# Patient Record
Sex: FEMALE | Race: WHITE | HISPANIC OR LATINO | URBAN - METROPOLITAN AREA
[De-identification: names, ages, dates, MRNs, and addresses within clinical notes are randomized per-mention and may not be internally consistent; named-entity substitution may affect disease eponyms.]

---

## 2023-10-16 NOTE — PROGRESS NOTES
"Franci Cooper is a 23 y.o. female  presents for complaint of painful cycles since she was 15 yo.     Menstrual cycle is monthly (q30-40 days, not the same each month), duration= 4-6 days, not heavy. She has severe cramping and nausea every cycle that is not relieved by ibuprofen or heat pads. Contraception: condoms only.     Denies excessive facial or chest hair. Denies pain with intercourse, discharge, itching, or odor.    Denies constipation, diarrhea. Denies dysuria, hematuria.     History reviewed. No pertinent past medical history.    History reviewed. No pertinent surgical history.    OB History    Para Term  AB Living   0 0 0 0 0 0   SAB IAB Ectopic Multiple Live Births   0 0 0 0 0       Family History   Problem Relation Age of Onset    Ovarian cancer Maternal Grandmother     Clotting disorder Mother         blood clots       Social History     Tobacco Use    Smoking status: Every Day     Types: Vaping with nicotine    Smokeless tobacco: Never   Substance Use Topics    Alcohol use: Yes     Comment: occassionally    Drug use: Never       Ht 5' 3" (1.6 m)   Wt 83.8 kg (184 lb 11.9 oz)   LMP 10/14/2023   BMI 32.73 kg/m²     ROS:  GENERAL: Denies weight gain or weight loss. Feeling well overall.   SKIN: Denies rash or lesions.   HEAD: Denies head injury or headache.   ABDOMEN: No abdominal pain, constipation, diarrhea, nausea, vomiting or rectal bleeding.   URINARY: No frequency, dysuria, hematuria, or burning on urination.  REPRODUCTIVE: See HPI.   HEMATOLOGIC: No easy bruisability or excessive bleeding.    Physical Exam:    APPEARANCE: Well nourished, well developed, in no acute distress.  AFFECT: WNL, alert and oriented x 3  SKIN: No acne or hirsutism  CHEST: Good respiratory effect  ABDOMEN: Soft.  No tenderness or masses. No hernias.  PELVIC: Deferred  EXTREMITIES: No edema.    ASSESSMENT AND PLAN  1. Painful menstruation  US Transvaginal Non OB        Pelvic ultrasound ordered to r/o " structural causes of painful menses. Advised to use 800mg ibuprofen q8hrs prn for pain. Discussed birth control options, risk of blood clotting with estrogen and increased risk with current vape use. She is willing to quit vaping but wants to wait for ultrasound results before initiating.     Follow up pending results.    Patient confirms understanding of encounter and all medical questions answered.

## 2023-10-19 ENCOUNTER — OFFICE VISIT (OUTPATIENT)
Dept: OBSTETRICS AND GYNECOLOGY | Facility: CLINIC | Age: 23
End: 2023-10-19
Payer: MEDICAID

## 2023-10-19 VITALS — HEIGHT: 63 IN | BODY MASS INDEX: 32.73 KG/M2 | WEIGHT: 184.75 LBS

## 2023-10-19 DIAGNOSIS — N94.6 PAINFUL MENSTRUATION: Primary | ICD-10-CM

## 2023-10-19 PROCEDURE — 99203 OFFICE O/P NEW LOW 30 MIN: CPT | Mod: PBBFAC,PO

## 2023-10-19 PROCEDURE — 1159F PR MEDICATION LIST DOCUMENTED IN MEDICAL RECORD: ICD-10-PCS | Mod: CPTII,,,

## 2023-10-19 PROCEDURE — 1160F PR REVIEW ALL MEDS BY PRESCRIBER/CLIN PHARMACIST DOCUMENTED: ICD-10-PCS | Mod: CPTII,,,

## 2023-10-19 PROCEDURE — 1159F MED LIST DOCD IN RCRD: CPT | Mod: CPTII,,,

## 2023-10-19 PROCEDURE — 3008F BODY MASS INDEX DOCD: CPT | Mod: CPTII,,,

## 2023-10-19 PROCEDURE — 1160F RVW MEDS BY RX/DR IN RCRD: CPT | Mod: CPTII,,,

## 2023-10-19 PROCEDURE — 3008F PR BODY MASS INDEX (BMI) DOCUMENTED: ICD-10-PCS | Mod: CPTII,,,

## 2023-10-19 PROCEDURE — 99999 PR PBB SHADOW E&M-NEW PATIENT-LVL III: ICD-10-PCS | Mod: PBBFAC,,,

## 2023-10-19 PROCEDURE — 99999 PR PBB SHADOW E&M-NEW PATIENT-LVL III: CPT | Mod: PBBFAC,,,

## 2023-10-19 PROCEDURE — 99203 PR OFFICE/OUTPT VISIT, NEW, LEVL III, 30-44 MIN: ICD-10-PCS | Mod: S$PBB,,,

## 2023-10-19 PROCEDURE — 99203 OFFICE O/P NEW LOW 30 MIN: CPT | Mod: S$PBB,,,

## 2023-10-19 RX ORDER — DOXYCYCLINE 100 MG/1
100 CAPSULE ORAL 2 TIMES DAILY
COMMUNITY
Start: 2023-07-26

## 2023-10-20 ENCOUNTER — HOSPITAL ENCOUNTER (OUTPATIENT)
Dept: RADIOLOGY | Facility: HOSPITAL | Age: 23
Discharge: HOME | End: 2023-10-20
Payer: MEDICAID

## 2023-10-20 DIAGNOSIS — N94.6 PAINFUL MENSTRUATION: ICD-10-CM

## 2023-10-20 PROCEDURE — 76830 TRANSVAGINAL US NON-OB: CPT | Mod: 26,,, | Performed by: RADIOLOGY

## 2023-10-20 PROCEDURE — 76830 US PELVIS COMP WITH TRANSVAG NON-OB (XPD): ICD-10-PCS | Mod: 26,,, | Performed by: RADIOLOGY

## 2023-10-20 PROCEDURE — 76856 US EXAM PELVIC COMPLETE: CPT | Mod: 26,,, | Performed by: RADIOLOGY

## 2023-10-20 PROCEDURE — 76830 TRANSVAGINAL US NON-OB: CPT | Mod: TC

## 2023-10-20 PROCEDURE — 76856 US PELVIS COMP WITH TRANSVAG NON-OB (XPD): ICD-10-PCS | Mod: 26,,, | Performed by: RADIOLOGY

## 2024-01-22 ENCOUNTER — PATIENT MESSAGE (OUTPATIENT)
Dept: OBSTETRICS AND GYNECOLOGY | Facility: CLINIC | Age: 24
End: 2024-01-22
Payer: MEDICAID

## 2024-04-11 ENCOUNTER — PATIENT MESSAGE (OUTPATIENT)
Dept: OBSTETRICS AND GYNECOLOGY | Facility: CLINIC | Age: 24
End: 2024-04-11
Payer: MEDICAID

## 2025-01-10 ENCOUNTER — TELEPHONE (OUTPATIENT)
Dept: ORTHOPEDICS | Facility: CLINIC | Age: 25
End: 2025-01-10
Payer: MEDICAID

## 2025-01-10 ENCOUNTER — PATIENT MESSAGE (OUTPATIENT)
Dept: ORTHOPEDICS | Facility: CLINIC | Age: 25
End: 2025-01-10
Payer: MEDICAID

## 2025-02-10 ENCOUNTER — OFFICE VISIT (OUTPATIENT)
Dept: PODIATRY | Facility: CLINIC | Age: 25
End: 2025-02-10
Payer: MEDICAID

## 2025-02-10 VITALS — BODY MASS INDEX: 33.02 KG/M2 | WEIGHT: 186.38 LBS

## 2025-02-10 DIAGNOSIS — M79.671 BILATERAL FOOT PAIN: Primary | ICD-10-CM

## 2025-02-10 DIAGNOSIS — S92.354S CLOSED NONDISPLACED FRACTURE OF FIFTH METATARSAL BONE OF RIGHT FOOT, SEQUELA: ICD-10-CM

## 2025-02-10 DIAGNOSIS — M76.71 PERONEAL TENDINITIS OF RIGHT LOWER EXTREMITY: Primary | ICD-10-CM

## 2025-02-10 DIAGNOSIS — M79.672 BILATERAL FOOT PAIN: Primary | ICD-10-CM

## 2025-02-10 PROCEDURE — 99213 OFFICE O/P EST LOW 20 MIN: CPT | Mod: PBBFAC,PN | Performed by: PODIATRIST

## 2025-02-10 PROCEDURE — 99999 PR PBB SHADOW E&M-EST. PATIENT-LVL III: CPT | Mod: PBBFAC,,, | Performed by: PODIATRIST

## 2025-02-10 RX ORDER — METHYLPREDNISOLONE 4 MG/1
TABLET ORAL
Qty: 21 EACH | Refills: 0 | Status: SHIPPED | OUTPATIENT
Start: 2025-02-10

## 2025-02-10 NOTE — PROGRESS NOTES
[facility] - PODIATRY  [address]  [address]  [place] [address]  Dept: [phone]  Dept Fax: [phone]    Joseph Henry Jr., DPM     Assessment:   MDM    Coding  1. Peroneal tendinitis of right lower extremity  methylPREDNISolone (MEDROL DOSEPACK) 4 mg tablet      2. Closed nondisplaced fracture of fifth metatarsal bone of right foot, sequela            Plan:     Procedures  1. Peroneal tendinitis of right lower extremity  -     methylPREDNISolone (MEDROL DOSEPACK) 4 mg tablet; use as directed  Dispense: 21 each; Refill: 0    2. Closed nondisplaced fracture of fifth metatarsal bone of right foot, sequela      Franci was seen today for foot injury.    Diagnoses and all orders for this visit:    Peroneal tendinitis of right lower extremity  -     methylPREDNISolone (MEDROL DOSEPACK) 4 mg tablet; use as directed    Closed nondisplaced fracture of fifth metatarsal bone of right foot, sequela        -pt seen, evaluated, and managed  -dx discussed in detail. All questions/concerns addressed  -all tx options discussed. All alternatives, risks, benefits of all txs discussed  -We discussed conservative care options possible including but not limited to shoe wear and/or padding, bracing/strapping, at home ROM, formal PT, medical therapy, injection therapy  - The utilization of NSAIDs can be considered but their benefit has to be tempered against the risk of GI/ concerns  - A steroid injection can be undertaken.  We did discuss the potential mechanism of action of this shot.  Understanding that multiple injections at the same anatomic site do have deleterious effects on the soft tissue.  Generic risks include: steroid flare (advised to ice if necessary), skin hypo-pgimentation (which can be permanent and unsightly), elevation of blood sugar, subcutaneous atrophy (can be permanent) and infection.   -XR/imaging reviewed by me: avulsion fx 5th MT base - mostly healed  -labs  reviewed by me: ok for medrol  -implemented icing/stretching regimen  - CAM boot  dispensed  -pwbat on heel in CAM when not working  -ok to return to work full duty, no restrictions    -rxs dispensed: medrol  -referrals: none  -WB: as above      Follow up in about 6 weeks (around 3/24/2025).    Subjective:      Patient ID: Franci Cooper is a 25 y.o. female.    Chief Complaint:   Chief Complaint   Patient presents with    Foot Injury     Right        CC - foot pain: patient presents to the podiatry clinic  with complaint of  right foot pain. Onset of the symptoms was several months ago. Precipitating event: broke foot in sept 2024. Never had f/u or tx other than urgent care.  Current symptoms include: ability to bear weight, but with some pain, stiffness, swelling and worsening symptoms after a period of activity. Aggravating factors: walking and certain shoegear. Symptoms have gradually worsened. Patient has had no prior foot problems. Evaluation to date: none. Treatment to date: avoidance of offending activity. Patients rates pain 7/10 on pain scale.        HPI    Last Podiatry Enc: Visit date not found  Last Enc w/ Me: Visit date not found    Outside reports reviewed: historical medical records.  Family hx: as below  No past medical history on file.  No past surgical history on file.  Family History   Problem Relation Name Age of Onset    Ovarian cancer Maternal Grandmother      Clotting disorder Mother          blood clots     Current Outpatient Medications   Medication Sig Dispense Refill    doxycycline (VIBRAMYCIN) 100 MG Cap Take 100 mg by mouth 2 (two) times daily.      methylPREDNISolone (MEDROL DOSEPACK) 4 mg tablet use as directed 21 each 0     No current facility-administered medications for this visit.     Review of patient's allergies indicates:  No Known Allergies  Social History     Socioeconomic History    Marital status: Single   Tobacco Use    Smoking status: Every Day     Types: Vaping with nicotine     Smokeless tobacco: Never   Substance and Sexual Activity    Alcohol use: Yes     Comment: occassionally    Drug use: Never    Sexual activity: Yes     Partners: Male     Birth control/protection: Condom       ROS    REVIEW OF SYSTEMS: Negative as documented below as well as positive findings in bold.       Constitutional  Respiratory  Gastrointestinal  Skin   - Fever - Cough - Heartburn - Rash   - Chills - Spit blood - Nausea - Itching   - Weight Loss - Shortness of breath - Vomiting - Nail pain   - Malaise/Fatigue - Wheezing - Abdominal Pain  Wound/Ulcer   - Weight Gain   - Blood in Stool  Poor wound healing       - Diarrhea          Cardiovascular  Genitourinary  Neurological  HEENT   - Chest Pain - Dysuria - Burning Sensation of feet - Headache   - Palpitations - Hematuria - Tingling / Paresthesia - Congestion   - Pain at night in legs - Flank Pain - Dizziness - Sore Throat   - Cramping   - Tremor - Blurred Vision   - Leg Swelling   - Sensory Change - Double Vision   - Dizzy when standing   - Speech Change - Eye Redness       - Focal Weakness - Dry Eyes       - Loss of Consciousness          Endocrine  Musculoskeletal  Psychiatric   - Cold intolerance - Muscle Pain - Depression   - Heat intolerance - Neck Pain - Insomnia   - Anemia - Joint Pain - Memory Loss   -  Easy bruising, bleeding - Heel pain - Anxiety      Toe Pain        Leg/Ankle/Foot Pain         Objective:     Wt 84.5 kg (186 lb 6.4 oz)   BMI 33.02 kg/m²   Vitals:    02/10/25 0904   Weight: 84.5 kg (186 lb 6.4 oz)   PainSc:   2       Physical Exam    General Appearance:   Patient appears well developed, well nourished  Patient appears stated age    Psychiatric:   Patient is oriented to time, place, and person.  Patient has appropriate mood and affect    Neck:  Trachea Midline  No visible masses    Respiratory/Ears:  No distress or labored breathing.  Able to differentiate between normal talking voice and whisper.  Able to follow  commands    Eyes:  Visual Acuity intact  Lids and conjunctivae normal. No discoloration noted.    Foot Exam  Physical Exam  Ortho Exam  Ortho/SPM Exam  Physical Exam  Foot/Ankle Musculoskeletal Exam    R LE exam con't:  V:  DP 2/4, PT 2/4   CRT< 3s to all digits tested   Tibial and popliteal lymph nodes are w/o abnormality   Edema: absent, varicosities: absent    N:  Patient displays normal ankle reflexes   SILT in SP/DP/T/Tod/Saph distributions    Ortho: +Motor EHL/FHL/TA/GA   Observed enlarged bony prominence at the 5th MT base  There is moderate pain with palpation of peroneal insertion and 5th MT base  Compartments soft/compressible. No pain on passive stretch of big toe. No calf  Pain.    Derm:  skin intact, skin warm and dry, skin without ulcers or lesions, skin without induration, nails normal, no erythema and no ecchymosis    Imaging / Labs:      X-Ray Foot Complete 3 view Right    Result Date: 2/10/2025  EXAMINATION: XR FOOT COMPLETE 3 VIEW RIGHT CLINICAL HISTORY: . Pain in right foot TECHNIQUE: AP, lateral, and oblique views of the right foot were performed. COMPARISON: None FINDINGS: No evidence of acute fracture or dislocation.  Prominent osteophyte at the level of the base of the 5th metatarsal which may represent remote injury.  Joint spaces are maintained.  No osseous destructive process.  No radiopaque foreign body.     No evidence of acute fracture. Electronically signed by: Tanner Reyna MD Date:    02/10/2025 Time:    08:51        Note: This was dictated using a computer transcription program. Although proofread, it may contain computer transcription errors and phonetic errors. Other human proofreading errors may also exist. Corrections may be performed at a later time. Please contact us for any clarification if needed.    Joseph Henry DPM  [facility]

## 2025-02-10 NOTE — PATIENT INSTRUCTIONS
Peroneal Tendon Injuries  What Are the Peroneal Tendons?         A tendon is a band of tissue that connects a muscle to a bone. The two peroneal tendons in the foot run side by side behind the outer ankle bone. One peroneal tendon attaches to the outer part of the midfoot, while the other tendon runs under the foot and attaches near the inside of the arch. The main function of the peroneal tendons is to stabilize the foot and ankle and protect them from sprains.    Causes & Symptoms of Peroneal Tendon Injuries  Peroneal tendon injuries may be acute (occurring suddenly) or chronic (developing over a period of time). They most commonly occur in individuals who participate in sports that involve repetitive ankle motion. In addition, people with higher arches are at risk for developing peroneal tendon injuries. Basic types of peroneal tendon injuries are tendonitis, tears and subluxation.    Tendonitis is an inflammation of one or both tendons. The inflammation is caused by activities involving repetitive use of the tendon, overuse of the tendon or trauma (such as an ankle sprain). Symptoms of tendonitis include:    Pain  Swelling  Warm to the touch     Acute tears are caused by repetitive activity or trauma. Immediate symptoms of acute tears include:    Pain  Swelling  Weakness or instability of the foot and ankle     As time goes on, these tears may lead to a change in the shape of the foot in which the arch may become higher.    Degenerative tears (tendonosis) are usually due to overuse and occur over long periods of time, often years. In degenerative tears, the tendon is like taffy that has been overstretched until it becomes thin and eventually frays. Having high arches also puts you at risk for developing a degenerative tear. The symptoms of degenerative tears may include:    Sporadic pain (occurring from time to time) on the outside of the ankle  Weakness or instability in the ankle  An increase in the height of  the arch     Subluxation means one or both tendons have slipped out of their normal position. In some cases, subluxation is due to a condition in which a person is born with a variation in the shape of the bone or muscle. In other cases, subluxation occurs following trauma, such as an ankle sprain. Damage or injury to the tissues that stabilize the tendons (retinaculum) can lead to chronic tendon subluxation. The symptoms of subluxation may include:    A snapping feeling of the tendon around the ankle bone  Sporadic pain behind the outside ankle bone  Ankle instability or weakness     Early treatment of a subluxation is critical since a tendon that continues to sublux (move out of position) is more likely to tear or rupture. Therefore, if you feel the characteristic snapping, see a foot and ankle surgeon immediately.    Diagnosis  Because peroneal tendon injuries are sometimes misdiagnosed and may worsen without proper treatment, prompt evaluation by a foot and ankle surgeon is advised. To diagnose a peroneal tendon injury, the surgeon will examine the foot and look for pain, instability, swelling, warmth and weakness on the outer side of the ankle. In addition, an x-ray or other advanced imaging studies may be needed to fully evaluate the injury. The foot and ankle surgeon will also look for signs of an ankle sprain and other related injuries that sometimes accompany a peroneal tendon injury. Proper diagnosis is important because prolonged discomfort after a simple sprain may be a sign of additional problems.    Nonsurgical Treatment  Treatment depends on the type of peroneal tendon injury. Options include:    Immobilization. A cast or splint may be used to keep the foot and ankle from moving and allow the injury to heal.  Medications. Oral or injected anti-inflammatory drugs may help relieve pain and inflammation.  Physical therapy. Ice, heat or ultrasound therapy may be used to reduce swelling and pain. As  symptoms improve, exercises can be added to strengthen the muscles and improve range of motion and balance.  Bracing. The surgeon may provide a brace to use for a short while or during activities requiring repetitive ankle motion. Bracing may also be an option when a patient is not a candidate for surgery.     When Is Surgery Needed?  In some cases, surgery may be needed to repair the tendon or tendons and perhaps the supporting structures of the foot. The foot and ankle surgeon will determine the most appropriate procedure for the patients condition and lifestyle. After surgery, physical therapy is an important part of rehabilitation.      What is Tendonitis of the Foot?  When you use a set of muscles too much, youre likely to strain the tendons (soft tissues) that connect those muscles to your bones. At first, pain or swelling may come and go quickly. But if you do too much too soon, your muscles may overtire again. The strain may cause a tendons outer covering to swell or small fibers in a tendon to pull apart. If you keep pushing your muscles, damage to the tendons adds up, and tendonitis develops. Over time, pain and swelling may limit your activities. But with your doctors help, tendonitis can be controlled. Both your symptoms and your risk of future problems including tendon rupture can be reduced.       The back of your foot  The Achilles tendon connects the calf muscle to the heel bone. If tendonitis occurs here, you may feel pain when your foot touches down or when your heel lifts off the ground.   The front of your foot  The anterior tibial tendon helps control the front of your foot when it meets the ground. If this tendon is strained, you may feel pain when you go down stairs or walk or run on hills.     The inside of your foot  The posterior tibial tendon runs along the inside of the ankle and foot. If this tendon is strained, your foot may hurt when it moves forward to push off the ground. Or you  may feel pain when your heel shifts from side to side.   The outside of your foot  The peroneal tendon wraps across the bottom of your foot, from the outside to the inside. Tendonitis here may cause pain when you stand or push off the ground and when walking on uneven surfaces.   Date Last Reviewed: 9/21/2015  © 9465-0320 [employer]. [address] [place] [address]. All rights reserved. This information is not intended as a substitute for professional medical care. Always follow your healthcare professional's instructions.        Treating Tendonitis of the Foot  Your healthcare provider's first concern is to reduce your symptoms. Using ice and heat, taking medicines, and limiting activity help control pain and swelling. Follow all of your healthcare provider's instructions. Returning to activity too soon may cause your symptoms to come back.    Ice and heat  Ice helps prevent swelling and reduce pain. Place ice on the painful area for 10 to 15 minutes. Repeat the icing several times a day. If ?you have had the problem for a while, using heat may help. Apply a heating pad or hot towels to the tendon for 20 to 30 minutes 2 or 3 times a day.  Medicines  Your healthcare provider may tell you to take ibuprofen or other anti-inflammatory medicines. These reduce pain and swelling. Take them as directed. Dont wait until you feel pain. In more severe cases, cortisone may be injected to relieve pain.  Limiting activities  Rest allows the tissues in your foot to heal. Stay off your feet for a few days, then slowly work back into activity. If you do high-impact activities, such as running or aerobics, try other activities that place less strain on your foot. Cycling and swimming are good choices.  Date Last Reviewed: 9/21/2015  © 7826-4629 [employer]. [address] [place] [address]. All rights reserved. This information is not intended as a substitute for professional medical  care. Always follow your healthcare professional's instructions.

## 2025-03-19 ENCOUNTER — PATIENT MESSAGE (OUTPATIENT)
Dept: PODIATRY | Facility: CLINIC | Age: 25
End: 2025-03-19
Payer: MEDICAID

## 2025-03-19 ENCOUNTER — TELEPHONE (OUTPATIENT)
Dept: PODIATRY | Facility: CLINIC | Age: 25
End: 2025-03-19
Payer: MEDICAID

## 2025-03-19 NOTE — TELEPHONE ENCOUNTER
----- Message from Meliza sent at 3/19/2025  1:50 PM CDT -----  Type:  Needs Medical AdviceWho Called: PatientBest Call Back Number: [facility]Additional Information: Patient is requesting a message to be sent to her about her upcoming appointment, she is having difficulties with taking off work.

## 2025-05-30 ENCOUNTER — OFFICE VISIT (OUTPATIENT)
Facility: CLINIC | Age: 25
End: 2025-05-30
Payer: MEDICAID

## 2025-05-30 ENCOUNTER — PATIENT MESSAGE (OUTPATIENT)
Facility: CLINIC | Age: 25
End: 2025-05-30

## 2025-05-30 VITALS
HEIGHT: 63 IN | SYSTOLIC BLOOD PRESSURE: 120 MMHG | WEIGHT: 184.31 LBS | BODY MASS INDEX: 32.66 KG/M2 | DIASTOLIC BLOOD PRESSURE: 80 MMHG

## 2025-05-30 DIAGNOSIS — Z01.419 WELL WOMAN EXAM WITH ROUTINE GYNECOLOGICAL EXAM: Primary | ICD-10-CM

## 2025-05-30 DIAGNOSIS — N94.6 DYSMENORRHEA: ICD-10-CM

## 2025-05-30 PROCEDURE — 99999 PR PBB SHADOW E&M-EST. PATIENT-LVL II: CPT | Mod: PBBFAC,,, | Performed by: STUDENT IN AN ORGANIZED HEALTH CARE EDUCATION/TRAINING PROGRAM

## 2025-05-30 PROCEDURE — 99212 OFFICE O/P EST SF 10 MIN: CPT | Mod: PBBFAC,PN | Performed by: STUDENT IN AN ORGANIZED HEALTH CARE EDUCATION/TRAINING PROGRAM

## 2025-05-30 RX ORDER — KETOROLAC TROMETHAMINE 10 MG/1
10 TABLET, FILM COATED ORAL EVERY 6 HOURS
Qty: 20 TABLET | Refills: 0 | Status: SHIPPED | OUTPATIENT
Start: 2025-05-30 | End: 2025-06-04

## 2025-05-30 NOTE — PROGRESS NOTES
CC: Well woman exam    HPI:  Franci Cooper is a 25 y.o. female  presents for a well woman exam.  She reports severe pain with periods, getting worse with time. Last few months has had trouble at work due to pain, feels like it is preventing some of her normal day to day activities.       Patient history:   History reviewed. No pertinent past medical history.  History reviewed. No pertinent surgical history.  OB History    Para Term  AB Living   0 0 0 0 0 0   SAB IAB Ectopic Multiple Live Births   0 0 0 0 0       GYN  Menopausal: No  History of abnormal paps: N/A  Abnormal or postmenopausal bleeding: DENIES  History of abnormal mammograms:N/A   Family history of breast or ovarian cancer: ovarian   Any breast masses, pain, skin changes, or nipple discharge: DENIES  Possible recent STD exposure: denies  Contraception: Condoms    Pap: Done today  Mammogram: N/A      Family History   Problem Relation Name Age of Onset    Ovarian cancer Maternal Grandmother      Clotting disorder Mother          blood clots     Social History[1]  Allergies: Patient has no known allergies.  Current Medications[2]       ROS:  GENERAL: Denies weight gain or weight loss. Feeling well overall.   SKIN: Denies rash or lesions.   HEAD: Denies head injury or headache.   NODES: Denies enlarged lymph nodes.   CHEST: Denies chest pain or shortness of breath.   CARDIOVASCULAR: Denies palpitations or left sided chest pain.   ABDOMEN: No abdominal pain, constipation, diarrhea, nausea, vomiting or rectal bleeding.   URINARY: No frequency, dysuria, hematuria, or burning on urination.  REPRODUCTIVE: See HPI.   BREASTS: The patient performs breast self-examination and denies pain, lumps, or nipple discharge.   HEMATOLOGIC: No easy bruisability or excessive bleeding.  MUSCULOSKELETAL: Denies joint pain or swelling.   NEUROLOGIC: Denies syncope or weakness.   PSYCHIATRIC: Denies depression, anxiety or mood swings.    Objective:   /80   " Ht 5' 3" (1.6 m)   Wt 83.6 kg (184 lb 4.9 oz)   LMP 05/20/2025 (Exact Date)   BMI 32.65 kg/m²       Physical Exam:  APPEARANCE: Well nourished, well developed, in no acute distress.  AFFECT: WNL, alert and oriented x 3  SKIN: No acne or hirsutism  NECK: Neck symmetric without masses or thyromegaly  CHEST: Good respiratory effect  ABDOMEN: Soft.  No tenderness or masses.  No hepatosplenomegaly.  No hernias.  BREASTS: deferred  PELVIC: Normal external genitalia without lesions.  Normal hair distribution.  Adequate perineal body, normal urethral meatus.  Vagina moist and well rugated without lesions or discharge.  Cervix pink, without lesions, discharge or tenderness.  No significant cystocele or rectocele.    EXTREMITIES: No edema.    ASSESSMENT AND PLAN  1. Well woman exam with routine gynecological exam  Liquid-Based Pap Smear, Screening      2. Dysmenorrhea  ketorolac (TORADOL) 10 mg tablet          Annual exam  pelvic exam: WNL  Patient counseled on ASCCP guidelines for cervical cytology screening  Cervical screening: done today  Patient counseled on current recommendations for breast cancer screening  Mammogram screening: at 40  STD testing: not requested today   Contraception: condoms, does not want to use hormonal birth control due to mother's hx of blood clot/passing from blood clot   Osteoporosis screening at 65      2. Dysmenorrhea   - discussed conservative management with exercise, heat packs, warm shower/bath  - continue OTC tylenol and will trial toradol  - discussed use of OCPs, reviewed role of empiric GNRH agonist/antagonist for suspected endometriosis related dysmenorrhea vs diagnostic laparoscopy to rule out pelvic pathology    - [employer] message sent with additional information for orilissa/trever     She was counseled to follow up with her PCP for other routine health maintenance      Follow up in about 1 year (around 5/30/2026), or if symptoms worsen or fail to improve.      Betsy" Heaney, MD OBGYN [facility]          [1]   Social History  Tobacco Use    Smoking status: Every Day     Types: Vaping with nicotine    Smokeless tobacco: Never   Substance Use Topics    Alcohol use: Yes     Comment: occassionally    Drug use: Never   [2]   Current Outpatient Medications:     doxycycline (VIBRAMYCIN) 100 MG Cap, Take 100 mg by mouth 2 (two) times daily., Disp: , Rfl:     ketorolac (TORADOL) 10 mg tablet, Take 1 tablet (10 mg total) by mouth every 6 (six) hours. for 5 days, Disp: 20 tablet, Rfl: 0    methylPREDNISolone (MEDROL DOSEPACK) 4 mg tablet, use as directed (Patient not taking: Reported on 5/30/2025), Disp: 21 each, Rfl: 0

## 2025-06-05 ENCOUNTER — RESULTS FOLLOW-UP (OUTPATIENT)
Dept: OBSTETRICS AND GYNECOLOGY | Facility: CLINIC | Age: 25
End: 2025-06-05